# Patient Record
(demographics unavailable — no encounter records)

---

## 2025-03-21 NOTE — PHYSICAL EXAM
[FreeTextEntry1] :  Patient with moderate bilateral tremor, postural > action > resting, affecting both hands and arms, with the right side more severely affected. There is also a very mild resting component on the right > left side. No tremor in the lower extremities.  mild-moderate head and lips tremor  No significant voice tremor.  No rigidity, cogwheeling, no bradykinesia, some imprecision on tasks of motor dexterity which were affected by tremor.  No other Parkinsonian features.

## 2025-03-21 NOTE — ASSESSMENT
[FreeTextEntry1] : 68 year-old LH female patient with onset of action tremor in right hand at age 30 with worsening of bilateral hand essential tremor for about in 2020, R> L, referred by Dr. Agosto. She states over the past year it has worsened due to "nervousness". Pt was started on Primidone 50 mg QHS in July 2021. However, she experienced nausea so she stopped. Pt had brain MRI on 8/17/2021 which revealed mild small vessel disease. Pt had a Andrey scan on 11/3/2021 which was unremarkable.   At present resting tremor has worsened in Right hand especially, patient has become much more bothered by this. Did contract salmonella which worsened overall health in addition to ongoing stomach concerns which she follows with another provider for. Her fine and gross motor movements remain intact, but she has developed more trouble with purposeful movement of her right hand especially but remains able to complete all ADLS and IADLS. At present patient's blood pressure trends towards the low end and with newer evidence of possible increase in progression of ET to PD with high dose BB I remain hesitant to increase Propranolol. Will start low dose Zonisamide to reduce tremor frequency and amplitude. Discussed with patient pros, cons, side effects of medication. Also discussed possibility of focused ultrasound in the future after medication trail.  Plan: - Rx'ed Zonisamide goal of 50 mg BID may increase if tremor doesn't respond - Discussed focused ultrasound - Continue Propranolol  mg QD for tremor (bp on the low side) - Continue Xanax 0.25 mg BID PRN for anxiety - f/u with Blake in 3 months - f/u with Dr. Gillespie in 6 months  - Encouraged to increase exercise and physical activity and maintain an active social and intellectual life.  More than 50% of the visit were dedicated to review of treatment, therapeutic plan, and prognosis, and patient was counseled on coping and physical and emotional wellbeing.    [place] PA-C performed major elements of MDM and visit with final evaluation and critical steps of MDM as needed by Dr. Ludwin Gillespie

## 2025-03-21 NOTE — HISTORY OF PRESENT ILLNESS
[FreeTextEntry1] : 68 year-old LH female patient with onset of action tremor in right hand at age 30 with worsening of bilateral hand essential tremor for about in 2020, R> L, referred by Dr. Agosto. She states over the past year it has worsened due to "nervousness". Pt was started on Primidone 50 mg QHS in July 2021. However, she experienced nausea so she stopped. Pt had brain MRI on 8/17/2021 which revealed mild small vessel disease. Pt had a Andrey scan on 11/3/2021 which was unremarkable.   At present resting tremor has worsened in Right hand especially, patient has become much more bothered by this. Did contract salmonella which worsened overall health in addition to ongoing stomach concerns which she follows with another provider for. Her fine and gross motor movements remain intact, but she has developed more trouble with purposeful movement of her right hand especially but remains able to complete all ADLS and IADLS. At present patient's blood pressure trends towards the low end and with newer evidence of possible increase in progression of ET to PD with high dose BB I remain hesitant to increase Propranolol. Will start low dose Zonisamide to reduce tremor frequency and amplitude. Discussed with patient pros, cons, side effects of medication. Also discussed possibility of focused ultrasound in the future after medication trail.  Pt states she participates in PT 1 x a week for back pain. Pt walks daily for 30 minutes.  Current Medications Propranolol  mg HCTZ 25 mg QD Atorvastatin 20 mg QD Melatonin 10 QHS Xanax 0.25 mg PRN BID   Previous Medication: Primidone [place]

## 2025-07-29 NOTE — HISTORY OF PRESENT ILLNESS
[FreeTextEntry1] : 68 year-old  female patient with onset of action tremor in right hand at age 30 with worsening of bilateral hand essential tremor for about in 2020, R> L, referred by Dr. Agosto. She states over the past year it has worsened due to "nervousness". Pt was started on Primidone 50 mg QHS in July 2021. However, she experienced nausea so she stopped. Pt had brain MRI on 8/17/2021 which revealed mild small vessel disease. Pt had a Andrey scan on 11/3/2021 which was unremarkable.   Last seen on 3/21/2025, at that visit she was advised to start Zonisamide 50mg BID to help reduce tremor frequency and amplitude, continue Propanolol ER 120mg and Xanax 0.25mg BID as needed.  At this vivist, pt reports "terrible" with tremor. When she is calm, Hand tremor is there, but not bad. Tremor worsen with action. Right hand tremor much worsen than left hand.  Able to do ALDs independently. No issue using phone or keyboard (she is left-handed).  Did contract salmonella again in May which worsened overall health in addition to ongoing stomach concerns which she follows with another provider for. Blood pressure 12drops at this visit. Denies lightheadedness/dizziness regardless position change.  Pt states she participates in PT 1 x a week for back pain. Pt walks daily for 30 minutes.  Current Medications Propranolol  mg HCTZ 25 mg QD Atorvastatin 20 mg QD Melatonin 10mg QHS Xanax 0.25 mg PRN BID   Previous Medication: Primidone (Nausea)   Zonisamide 50mg BID (feel more forgetful and "jumpy", unable to feel calm)

## 2025-07-29 NOTE — ASSESSMENT
[FreeTextEntry1] : 68 year-old  female patient with onset of action tremor in right hand at age 30 with worsening of bilateral hand essential tremor for about in 2020, R> L, referred by Dr. Agosto. She states over the past year it has worsened due to "nervousness". Pt was started on Primidone 50 mg QHS in July 2021. However, she experienced nausea so she stopped. Pt had brain MRI on 8/17/2021 which revealed mild small vessel disease. Pt had a Andrey scan on 11/3/2021 which was unremarkable.   At the visit, pt reports hand tremors (R>L) worsen action and postural>resting, She is still active and functional, able to perform all ALDs on own. She tried Zonisamide but it makes her more forgetful and agitated. Discussed focused ultrasound but deferred at the moment. Patient reported tremor lessens when she is more calm - discussed and renewed Xanax.  Plan: - Discussed focused ultrasound - pt deferred at the moment - Continue Propranolol  mg QD for tremor (BP 12points dropped at this visit, denies lightheadedness) - Continue Xanax 0.25 mg BID PRN for anxiety - f/u with Dr. Gillespie in Nov as scheduled - f/u with SANTIAGO Tucker in about 6months - Encouraged to increase exercise and physical activity and maintain an active social and intellectual life.  More than 50% of the visit were dedicated to review of treatment, therapeutic plan, and prognosis, and patient was counseled on coping and physical and emotional wellbeing.   Wing Sagar Tucker NP